# Patient Record
Sex: MALE | Race: WHITE | NOT HISPANIC OR LATINO | Employment: FULL TIME | URBAN - METROPOLITAN AREA
[De-identification: names, ages, dates, MRNs, and addresses within clinical notes are randomized per-mention and may not be internally consistent; named-entity substitution may affect disease eponyms.]

---

## 2021-12-06 ENCOUNTER — TELEPHONE (OUTPATIENT)
Dept: SCHEDULING | Facility: IMAGING CENTER | Age: 51
End: 2021-12-06

## 2022-01-31 ENCOUNTER — OFFICE VISIT (OUTPATIENT)
Dept: MEDICAL GROUP | Facility: MEDICAL CENTER | Age: 52
End: 2022-01-31
Payer: COMMERCIAL

## 2022-01-31 VITALS
RESPIRATION RATE: 18 BRPM | OXYGEN SATURATION: 97 % | WEIGHT: 156.4 LBS | SYSTOLIC BLOOD PRESSURE: 104 MMHG | TEMPERATURE: 98.4 F | HEART RATE: 72 BPM | HEIGHT: 69 IN | BODY MASS INDEX: 23.16 KG/M2 | DIASTOLIC BLOOD PRESSURE: 68 MMHG

## 2022-01-31 DIAGNOSIS — R73.01 IMPAIRED FASTING GLUCOSE: ICD-10-CM

## 2022-01-31 DIAGNOSIS — E78.5 DYSLIPIDEMIA: ICD-10-CM

## 2022-01-31 DIAGNOSIS — Z00.00 HEALTHCARE MAINTENANCE: ICD-10-CM

## 2022-01-31 PROCEDURE — 99386 PREV VISIT NEW AGE 40-64: CPT | Performed by: FAMILY MEDICINE

## 2022-01-31 RX ORDER — MULTIVITAMIN
TABLET ORAL
COMMUNITY

## 2022-01-31 ASSESSMENT — PATIENT HEALTH QUESTIONNAIRE - PHQ9: CLINICAL INTERPRETATION OF PHQ2 SCORE: 0

## 2022-01-31 NOTE — PROGRESS NOTES
"[facility] of the [facility]  Progress Note  New Patient    Subjective:   Tulio Laughlin is a 51 y.o. male here today for a wellness visit. This is a new patient to me. The patient comes in alone.     Healthcare maintenance  Lipids: ordered.  Fasting Glucose: ordered.    Hepatitis C Screen: ordered.  Colonoscopy: completed 2020 with 10 year recall.     Tdap: recommended, patient declines currently but will consider.   Shingrix vaccine: recommended, patient declines currently but will consider.   COVID vaccine: recommended, patient declines currently but will consider.     Dyslipidemia  Noted on past labs.     Impaired fasting glucose  Noted on past labs.       Current Outpatient Medications on File Prior to Visit   Medication Sig Dispense Refill   • Multiple Vitamin Tab Take  by mouth.     • CINNAMON PO Take  by mouth.       No current facility-administered medications on file prior to visit.       History reviewed. No pertinent past medical history.    Allergies: Patient has no known allergies.    Surgical History:  has a past surgical history that includes eye surgery.    Family History: family history includes No Known Problems in his mother.    Social History:  reports that he has never smoked. He has never used smokeless tobacco. He reports that he does not drink alcohol and does not use drugs.         Objective:     Vitals:    01/31/22 1321   BP: 104/68   BP Location: Left arm   Patient Position: Sitting   BP Cuff Size: Adult long   Pulse: 72   Resp: 18   Temp: 36.9 °C (98.4 °F)   TempSrc: Temporal   SpO2: 97%   Weight: 70.9 kg (156 lb 6.4 oz)   Height: 1.753 m (5' 9.02\")       Physical Exam:  Constitutional: Alert, no distress.  Skin: Warm, dry, good turgor, no rashes in visible areas.  Eye: Equal, round and reactive, conjunctiva clear, lids normal.  Neck: Trachea midline, no masses, no thyromegaly. No cervical or supraclavicular lymphadenopathy  Respiratory: Unlabored respiratory effort, lungs clear to auscultation, no " wheezes, no ronchi.  Cardiovascular: Normal S1, S2, no murmur, no edema.  Abdomen: Soft, non-tender, no masses, no hepatosplenomegaly.  Psych: Alert and oriented, normal affect and mood.        Assessment and Plan:     1. Dyslipidemia  - Lipid Profile; Future    2. Impaired fasting glucose  - Blood Glucose; Future    3. Healthcare maintenance  - see HPI.   - age appropriate anticipatory guidance discussed including diet and exercise. Recommended 3 fruits, 4 veg and 1/2 gal of water each day.   - Lipid Profile; Future  - Blood Glucose; Future  - HEP C VIRUS ANTIBODY; Future        Followup: Return in about 1 year (around 1/31/2023), or if symptoms worsen or fail to improve, for Wellness Visit, Long.

## 2023-10-05 NOTE — ASSESSMENT & PLAN NOTE
Lipids: ordered.  Fasting Glucose: ordered.    Hepatitis C Screen: ordered.  Colonoscopy: completed 2020 with 10 year recall.     Tdap: recommended, patient declines currently but will consider.   Shingrix vaccine: recommended, patient declines currently but will consider.   COVID vaccine: recommended, patient declines currently but will consider.   
Noted on past labs.   
Noted on past labs.   
[id number]

## 2024-03-01 ENCOUNTER — APPOINTMENT (OUTPATIENT)
Dept: MEDICAL GROUP | Facility: LAB | Age: 54
End: 2024-03-01
Payer: COMMERCIAL